# Patient Record
Sex: MALE | Race: WHITE | NOT HISPANIC OR LATINO | Employment: OTHER | URBAN - METROPOLITAN AREA
[De-identification: names, ages, dates, MRNs, and addresses within clinical notes are randomized per-mention and may not be internally consistent; named-entity substitution may affect disease eponyms.]

---

## 2017-01-16 ENCOUNTER — GENERIC CONVERSION - ENCOUNTER (OUTPATIENT)
Dept: OTHER | Facility: OTHER | Age: 62
End: 2017-01-16

## 2017-02-06 ENCOUNTER — GENERIC CONVERSION - ENCOUNTER (OUTPATIENT)
Dept: OTHER | Facility: OTHER | Age: 62
End: 2017-02-06

## 2017-03-02 ENCOUNTER — GENERIC CONVERSION - ENCOUNTER (OUTPATIENT)
Dept: OTHER | Facility: OTHER | Age: 62
End: 2017-03-02

## 2017-03-06 ENCOUNTER — GENERIC CONVERSION - ENCOUNTER (OUTPATIENT)
Dept: OTHER | Facility: OTHER | Age: 62
End: 2017-03-06

## 2017-04-03 ENCOUNTER — GENERIC CONVERSION - ENCOUNTER (OUTPATIENT)
Dept: OTHER | Facility: OTHER | Age: 62
End: 2017-04-03

## 2017-05-04 ENCOUNTER — GENERIC CONVERSION - ENCOUNTER (OUTPATIENT)
Dept: OTHER | Facility: OTHER | Age: 62
End: 2017-05-04

## 2017-06-28 ENCOUNTER — GENERIC CONVERSION - ENCOUNTER (OUTPATIENT)
Dept: OTHER | Facility: OTHER | Age: 62
End: 2017-06-28

## 2017-08-22 ENCOUNTER — ALLSCRIPTS OFFICE VISIT (OUTPATIENT)
Dept: OTHER | Facility: OTHER | Age: 62
End: 2017-08-22

## 2017-08-22 LAB
BILIRUB UR QL STRIP: ABNORMAL
CLARITY UR: ABNORMAL
COLOR UR: YELLOW
GLUCOSE (HISTORICAL): ABNORMAL
HGB UR QL STRIP.AUTO: ABNORMAL
KETONES UR STRIP-MCNC: ABNORMAL MG/DL
LEUKOCYTE ESTERASE UR QL STRIP: ABNORMAL
NITRITE UR QL STRIP: ABNORMAL
PH UR STRIP.AUTO: 5 [PH]
PROT UR STRIP-MCNC: ABNORMAL MG/DL
SP GR UR STRIP.AUTO: <=1.005
UROBILINOGEN UR QL STRIP.AUTO: 0.2

## 2017-08-24 ENCOUNTER — ALLSCRIPTS OFFICE VISIT (OUTPATIENT)
Dept: OTHER | Facility: OTHER | Age: 62
End: 2017-08-24

## 2017-08-24 DIAGNOSIS — Z00.00 ENCOUNTER FOR GENERAL ADULT MEDICAL EXAMINATION WITHOUT ABNORMAL FINDINGS: ICD-10-CM

## 2017-08-24 DIAGNOSIS — E78.5 HYPERLIPIDEMIA: ICD-10-CM

## 2017-11-29 ENCOUNTER — GENERIC CONVERSION - ENCOUNTER (OUTPATIENT)
Dept: OTHER | Facility: OTHER | Age: 62
End: 2017-11-29

## 2017-11-30 ENCOUNTER — GENERIC CONVERSION - ENCOUNTER (OUTPATIENT)
Dept: OTHER | Facility: OTHER | Age: 62
End: 2017-11-30

## 2018-01-12 VITALS
WEIGHT: 193 LBS | HEIGHT: 68 IN | SYSTOLIC BLOOD PRESSURE: 102 MMHG | DIASTOLIC BLOOD PRESSURE: 72 MMHG | BODY MASS INDEX: 29.25 KG/M2

## 2018-01-15 NOTE — PROGRESS NOTES
Assessment    1  Encounter for preventive health examination (V70 0) (Z00 00)    Plan  Health Maintenance    · (1) COMPREHENSIVE METABOLIC PANEL; Status:Active; Requested for:24Aug2017;    · COLONOSCOPY; Status:Active; Requested for:24Aug2017;    · 3 - Tiago AGUILAR, Devon Mortimer  (Gastroenterology) Co-Management  *  Status: Hold For -  Scheduling  Requested for: 45RGI3100  are Referring to a non- Preferred Provider : Established Patient  Care Summary provided  : Yes   · Fluarix Quadrivalent 0 5 ML Intramuscular Suspension Prefilled Syringe;  INJECT 0 5  ML Intramuscular; To Be Done: 24Aug2017  Health Maintenance, Hyperlipidemia    · (1) CBC/ PLT (NO DIFF); Status:Active; Requested for:24Aug2017;    · (1) LIPID PANEL, FASTING; Status:Active; Requested for:24Aug2017;     Discussion/Summary  health maintenance visit Currently, he eats a healthy diet  Prostate cancer screening: the risks and benefits of prostate cancer screening were discussed  Testicular cancer screening: the risks and benefits of testicular cancer screening were discussed  Colorectal cancer screening: the risks and benefits of colorectal cancer screening were discussed  I will call him with the lab test results  We'll set him up for colonoscopy he  Continue current therapy follow-up in one year sooner if needed  Chief Complaint  PATIENT PRESENTS TODAY FOR A YEARLY PREVENTATIVE, STATES THAT IN MAY HE HAD A CARDIAC Ablastin AND IS FEELING BETTER  PATIENT WOULD ALSO LIKE TO HAVE A FLU SHOT TODAY  History of Present Illness  HM, Adult Male: The patient is being seen for a health maintenance evaluation  General Health: The patient's health since the last visit is described as good  Lifestyle:  He consumes a diverse and healthy diet  Screening:   HPI: He is feeling well  He had cardio ablation about 3 months ago or so and feels much better after        Review of Systems    Constitutional: No fever or chills, feels well, no tiredness, no recent weight gain or weight loss  Eyes: No complaints of eye pain, no red eyes, no discharge from eyes, no itchy eyes  ENT: no complaints of earache, no hearing loss, no nosebleeds, no nasal discharge, no sore throat, no hoarseness  Cardiovascular: No complaints of slow heart rate, no fast heart rate, no chest pain, no palpitations, no leg claudication, no lower extremity  Respiratory: No complaints of shortness of breath, no wheezing, no cough, no SOB on exertion, no orthopnea or PND  Gastrointestinal: No complaints of abdominal pain, no constipation, no nausea or vomiting, no diarrhea or bloody stools  Genitourinary: No complaints of dysuria, no incontinence, no hesitancy, no nocturia, no genital lesion, no testicular pain  Musculoskeletal: history of muscular dystrophy, but No complaints of arthralgia, no myalgias, no joint swelling or stiffness, no limb pain or swelling  Integumentary: No complaints of skin rash or skin lesions, no itching, no skin wound, no dry skin  Neurological: history of muscular dystrophy, but No compliants of headache, no confusion, no convulsions, no numbness or tingling, no dizziness or fainting, no limb weakness, no difficulty walking  Psychiatric: Is not suicidal, no sleep disturbances, no anxiety or depression, no change in personality, no emotional problems  Endocrine: No complaints of proptosis, no hot flashes, no muscle weakness, no erectile dysfunction, no deepening of the voice, no feelings of weakness  Hematologic/Lymphatic: No complaints of swollen glands, no swollen glands in the neck, does not bleed easily, no easy bruising  Active Problems    1  Benign prostatic hyperplasia with lower urinary tract symptoms, symptom details   unspecified (600 01) (N40 1)   2  Cardiomyopathy (425 4) (I42 9)   3  Encounter for prostate cancer screening (V76 44) (Z12 5)   4  Enlarged prostate without lower urinary tract symptoms (luts) (600 00) (N40 0)   5   Erectile dysfunction (607 84) (N52 9)   6  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)   7  Hyperlipidemia (272 4) (E78 5)   8  Hypertension (401 9) (I10)   9  Nocturia (788 43) (R35 1)   10  Paroxysmal ventricular tachycardia (427 1) (I47 2)   11  Retention of urine (788 20) (R33 9)   12  Sleep apnea (780 57) (G47 30)   13  Subconjunctival hemorrhage of right eye (372 72) (H11 31)   14  Urinary frequency (788 41) (R35 0)    Past Medical History    · Cardiomyopathy (425 4) (I42 9)   · History of atrial fibrillation (V12 59) (Z86 79)   · Hypertension (401 9) (I10)    Surgical History    · History of Appendectomy   · History of Cardio-Defib Pulse Gen Venous Insertion Of Electrode For Ventricular Pacing   · History of Hernia Repair   · History of Tonsillectomy    Family History  Mother    · No pertinent family history  Father    · Family history of malignant neoplasm of prostate (V16 42) (Z80 45)  Family History    · Family history of Heart Disease (V17 49)    Social History    · Being A Social Drinker   ·    · Never A Smoker    Current Meds   1  Finasteride 5 MG Oral Tablet; Take 1 tablet daily; Therapy: 51Fpo3979 to (Evaluate:02Bwk3502)  Requested for: 88Xbp0741; Last   Rx:97Iva1873 Ordered   2  Finasteride 5 MG Oral Tablet; TAKE 1 TABLET DAILY; Therapy: 52RLS6703 to (Evaluate:59Htd8558)  Requested for: 35JAP8311; Last   Rx:85Tji6170; Status: ACTIVE - Renewal Denied Ordered   3  Fish Oil CAPS; Therapy: (Recorded:69Pro5672) to Recorded   4  Lasix 40 MG Oral Tablet; Therapy: (Recorded:10Oct2016) to Recorded   5  Lisinopril 2 5 MG Oral Tablet; TAKE 1 TABLET TWICE DAILY; Therapy: (Recorded:19Vky1544) to Recorded   6  Meclizine HCl - 25 MG Oral Tablet; TAKE 1 TABLET  PRN; Therapy: (Recorded:48Sea8991) to Recorded   7  Metoprolol Succinate ER 25 MG Oral Tablet Extended Release 24 Hour; Take 1 tablet   twice daily; Therapy: (Recorded:05Gzq2333) to Recorded   8  Multiple Vitamin TABS;    Therapy: (Recorded:10Msc4955) to Recorded   9  Pantoprazole Sodium 40 MG Oral Tablet Delayed Release; Take 1 tablet twice daily; Therapy: (Recorded:78Smt7346) to Recorded   10  Sotalol HCl - 80 MG Oral Tablet; TAKE 1 TABLET TWICE DAILY; Therapy: 39JWC3262 to (Evaluate:97Dvh0971) Recorded   11  Spironolactone 25 MG Oral Tablet; Therapy: (Recorded:11Sep2014) to Recorded   12  Vitamin B-12 TABS; Therapy: (Recorded:11Sep2014) to Recorded   13  Vitamin D CAPS; Therapy: (Recorded:11Sep2014) to Recorded   14  Xarelto 20 MG Oral Tablet; Therapy: (Recorded:11Sep2014) to Recorded    Allergies    1  Statins    Vitals   Recorded: 54NBP9826 82:47MC   Systolic 609, RUE, Sitting   Diastolic 70, RUE, Sitting   Height 5 ft 8 in   Weight 199 lb 2 oz   BMI Calculated 30 28   BSA Calculated 2 04     Physical Exam    Constitutional   General appearance: No acute distress, well appearing and well nourished  Eyes   Conjunctiva and lids: No swelling, erythema, or discharge  Pupils and irises: Equal, round and reactive to light  Ears, Nose, Mouth, and Throat   External inspection of ears and nose: Normal     Otoscopic examination: Tympanic membrance translucent with normal light reflex  Canals patent without erythema  Oropharynx: Normal with no erythema, edema, exudate or lesions  Pulmonary   Respiratory effort: No increased work of breathing or signs of respiratory distress  Auscultation of lungs: Clear to auscultation  Cardiovascular   Palpation of heart: Normal PMI, no thrills  Auscultation of heart: Normal rate and rhythm, normal S1 and S2, without murmurs  Examination of extremities for edema and/or varicosities: Normal     Abdomen   Abdomen: Non-tender, no masses  Liver and spleen: No hepatomegaly or splenomegaly  Lymphatic   Palpation of lymph nodes in neck: No lymphadenopathy  Musculoskeletal   Gait and station: Normal     Digits and nails: Normal without clubbing or cyanosis  Inspection/palpation of joints, bones, and muscles: Normal     Skin   Skin and subcutaneous tissue: Normal without rashes or lesions  Neurologic   Cranial nerves: Cranial nerves 2-12 intact  Reflexes: 2+ and symmetric  Sensation: No sensory loss  Psychiatric   Orientation to person, place and time: Normal     Mood and affect: Normal        Results/Data  PHQ-9 Adult Depression Screening 05Ewl1172 09:14AM User, Ahs     Test Name Result Flag Reference   PHQ-9 Adult Depression Score 0     Over the last two weeks, how often have you been bothered by any of the following problems? Little interest or pleasure in doing things: Not at all - 0  Feeling down, depressed, or hopeless: Not at all - 0  Trouble falling or staying asleep, or sleeping too much: Not at all - 0  Feeling tired or having little energy: Not at all - 0  Poor appetite or over eating: Not at all - 0  Feeling bad about yourself - or that you are a failure or have let yourself or your family down: Not at all - 0  Trouble concentrating on things, such as reading the newspaper or watching television: Not at all - 0  Moving or speaking so slowly that other people could have noticed   Or the opposite -  being so fidgety or restless that you have been moving around a lot more than usual: Not at all - 0  Thoughts that you would be better off dead, or of hurting yourself in some way: Not at all - 0   PHQ-9 Adult Depression Screening Negative     PHQ-9 Difficulty Level Not difficult at all     PHQ-9 Severity No Depression         Future Appointments    Date/Time Provider Specialty Site   09/04/2018 09:00 AM Ruddy Rosa MD Urology [address] [place] [address]     Signatures   Electronically signed by : Mauri Velasquez DO; Aug 24 2017  9:40AM EST                       (Author)

## 2018-01-17 NOTE — MISCELLANEOUS
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)    Discussion/Summary  Discussion Summary:   61 y/o male with: Muscular dystrophy causing cardiomyopathy and V  tach  Discussed supportive care and return parameters  Encouraged follow-up with Cardiology and MD specialists  Encouraged patient to consider counselling as well  Chief Complaint  Chief Complaint Free Text Note Form: Patient presents himself today for [facility] after being released from hospital, Pt states that he is not feeling better  pt states that he is not having any pain  History of Present Illness  HPI: Patient is a 61 y/o male who presents for hospital follow-up  Patient had ICD shocked 8/6/16 over night for V  tach  Patient saw Arianna Iron and had Sotalol increased and had other medication adjustment and was discharged 8/15/16  Patient has h/o muscular dystrophy and h/o related cardiomyopathy and over the past year had better symptoms as he was not shocked since last year  Review of Systems  Complete-Male:   Constitutional: No fever or chills, feels well, no tiredness, no recent weight gain or weight loss  Eyes: No complaints of eye pain, no red eyes, no discharge from eyes, no itchy eyes  ENT: no complaints of earache, no hearing loss, no nosebleeds, no nasal discharge, no sore throat, no hoarseness  Cardiovascular: No complaints of slow heart rate, no fast heart rate, no chest pain, no palpitations, no leg claudication, no lower extremity  Respiratory: No complaints of shortness of breath, no wheezing, no cough, no SOB on exertion, no orthopnea or PND  Gastrointestinal: No complaints of abdominal pain, no constipation, no nausea or vomiting, no diarrhea or bloody stools  Genitourinary: No complaints of dysuria, no incontinence, no hesitancy, no nocturia, no genital lesion, no testicular pain     Musculoskeletal: No complaints of arthralgia, no myalgias, no joint swelling or stiffness, no limb pain or swelling  Integumentary: No complaints of skin rash or skin lesions, no itching, no skin wound, no dry skin  Neurological: No compliants of headache, no confusion, no convulsions, no numbness or tingling, no dizziness or fainting, no limb weakness, no difficulty walking  Psychiatric: Is not suicidal, no sleep disturbances, no anxiety or depression, no change in personality, no emotional problems  Endocrine: No complaints of proptosis, no hot flashes, no muscle weakness, no erectile dysfunction, no deepening of the voice, no feelings of weakness  Hematologic/Lymphatic: No complaints of swollen glands, no swollen glands in the neck, does not bleed easily, no easy bruising  Active Problems    1  Cardiomyopathy (425 4) (I42 9)   2  Enlarged prostate without lower urinary tract symptoms (luts) (600 00) (N40 0)   3  Erectile dysfunction (607 84) (N52 9)   4  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)   5  Hyperlipidemia (272 4) (E78 5)   6  Hypertension (401 9) (I10)   7  Nocturia (788 43) (R35 1)   8  Sleep apnea (780 57) (G47 30)   9  Subconjunctival hemorrhage of right eye (372 72) (H11 31)   10  Urinary frequency (788 41) (R35 0)    Past Medical History    1  History of atrial fibrillation (V12 59) (Z86 79)    Surgical History    1  History of Appendectomy   2  History of Cardio-Defib Pulse Gen Venous Insertion Of Electrode For Ventricular Pacing   3  History of Hernia Repair   4  History of Tonsillectomy    Family History  Mother    1  No pertinent family history  Family History    2  Family history of Heart Disease (V17 49)    Social History    · Being A Social Drinker   ·    · Never A Smoker    Current Meds   1  Enalapril Maleate 2 5 MG Oral Tablet; TAKE 1 TABLET TWICE DAILY; Therapy: 19PNL9327 to Recorded   2  Finasteride 5 MG Oral Tablet; TAKE 1 TABLET DAILY;    Therapy: 16FJR8097 to (Evaluate:84Rzq5719)  Requested for: 77RQH3868; Last   Rx:02Shw7881 Ordered   3  Fish Oil CAPS; Therapy: (Recorded:00Auv2929) to Recorded   4  Furosemide 20 MG Oral Tablet Recorded   5  Mexiletine HCl - 150 MG Oral Capsule; TAKE 1 CAPSULE 3 TIMES DAILY; Therapy: 00Bli9027 to Recorded   6  Multiple Vitamin TABS; Therapy: (Recorded:50Tzn0920) to Recorded   7  Sotalol HCl - 80 MG Oral Tablet; TAKE 1 TABLET TWICE DAILY; Therapy: 76KWT0536 to (Evaluate:73Efo1501) Recorded   8  Spironolactone 25 MG Oral Tablet; Therapy: (Recorded:76Mhn7172) to Recorded   9  Vitamin B-12 TABS; Therapy: (Recorded:87Edt6212) to Recorded   10  Vitamin D CAPS; Therapy: (Recorded:35Uqp8814) to Recorded   11  Xarelto 20 MG Oral Tablet; Therapy: (Recorded:08Mmi0016) to Recorded  Medication List Reviewed: The medication list was reviewed and updated today  Allergies    1  Statins    Vitals  Signs   Recorded: 94ZIW0538 18:93TS   Systolic: 546, RUE, Sitting  Diastolic: 70, RUE, Sitting  Heart Rate: 64, R Radial  Temperature: 97 9 F, Tympanic  Height: 5 ft 8 in  Weight: 192 lb 6 oz  BMI Calculated: 29 25  BSA Calculated: 2 01    Physical Exam    Constitutional   General appearance: No acute distress, well appearing and well nourished  Eyes   Conjunctiva and lids: No swelling, erythema, or discharge  Pupils and irises: Equal, round and reactive to light  Ears, Nose, Mouth, and Throat   External inspection of ears and nose: Normal     Pulmonary   Respiratory effort: No increased work of breathing or signs of respiratory distress  Auscultation of lungs: Clear to auscultation, equal breath sounds bilaterally, no wheezes, no rales, no rhonci  Cardiovascular   Auscultation of heart: Normal rate and rhythm, normal S1 and S2, without murmurs  Examination of extremities for edema and/or varicosities: Normal     Abdomen   Abdomen: Non-tender, no masses  Liver and spleen: No hepatomegaly or splenomegaly  Lymphatic   Palpation of lymph nodes in neck: No lymphadenopathy  Musculoskeletal   Gait and station: Normal     Digits and nails: Normal without clubbing or cyanosis  Skin   Skin and subcutaneous tissue: Normal without rashes or lesions  Neurologic   Cranial nerves: Cranial nerves 2-12 intact  Psychiatric   Orientation to person, place and time: Normal     Mood and affect: Normal          Future Appointments    Date/Time Provider Specialty Site   11/30/2016 08:30 AM FALGUNI Coates   Urology [facility] [place]   08/24/2017 09:00 AM Pavan Gallego [address]     Signatures   Electronically signed by : FALGUNI Hong ; Aug 23 2016  4:55PM EST                       (Author)

## 2018-01-22 VITALS
HEIGHT: 68 IN | WEIGHT: 199.13 LBS | BODY MASS INDEX: 30.18 KG/M2 | SYSTOLIC BLOOD PRESSURE: 102 MMHG | DIASTOLIC BLOOD PRESSURE: 70 MMHG

## 2018-08-03 DIAGNOSIS — N13.9 BENIGN LOCALIZED HYPERPLASIA OF PROSTATE WITH URINARY OBSTRUCTION AND LOWER URINARY TRACT SYMPTOMS: Primary | ICD-10-CM

## 2018-08-03 DIAGNOSIS — N40.1 BENIGN LOCALIZED HYPERPLASIA OF PROSTATE WITH URINARY OBSTRUCTION AND LOWER URINARY TRACT SYMPTOMS: Primary | ICD-10-CM

## 2018-08-03 RX ORDER — FINASTERIDE 5 MG/1
TABLET, FILM COATED ORAL
Qty: 90 TABLET | Refills: 3 | OUTPATIENT
Start: 2018-08-03

## 2018-08-03 NOTE — TELEPHONE ENCOUNTER
Patient was last seen in September, 2017 and has an upcoming appointment schedule for 9/5/18 @ 9:30am   Script for Finasteride 5mg 1 PO QD #90 with NO refills was queued and forwarded to Dr Danis Dias for approval

## 2018-08-04 RX ORDER — FINASTERIDE 5 MG/1
5 TABLET, FILM COATED ORAL DAILY
Qty: 90 TABLET | Refills: 0 | Status: SHIPPED | OUTPATIENT
Start: 2018-08-04

## 2018-08-22 DIAGNOSIS — Z12.5 ENCOUNTER FOR SCREENING FOR MALIGNANT NEOPLASM OF PROSTATE: ICD-10-CM

## 2018-08-29 RX ORDER — METOPROLOL SUCCINATE 25 MG/1
12.5 TABLET, EXTENDED RELEASE ORAL 2 TIMES DAILY
Refills: 3 | COMMUNITY
Start: 2018-06-25

## 2018-08-29 RX ORDER — MECLIZINE HYDROCHLORIDE 25 MG/1
1 TABLET ORAL
COMMUNITY

## 2018-08-29 RX ORDER — PANTOPRAZOLE SODIUM 40 MG/1
1 TABLET, DELAYED RELEASE ORAL 2 TIMES DAILY
COMMUNITY

## 2018-08-29 RX ORDER — LISINOPRIL 2.5 MG/1
2.5 TABLET ORAL 2 TIMES DAILY
Refills: 3 | COMMUNITY
Start: 2018-06-25

## 2018-08-29 RX ORDER — FUROSEMIDE 20 MG/1
20 TABLET ORAL DAILY
Refills: 1 | COMMUNITY
Start: 2018-07-20

## 2018-08-29 RX ORDER — MULTIVIT-MIN/IRON/FOLIC ACID/K 18-600-40
CAPSULE ORAL
COMMUNITY

## 2018-08-29 RX ORDER — SPIRONOLACTONE 25 MG/1
25 TABLET ORAL DAILY
Refills: 1 | COMMUNITY
Start: 2018-06-25

## 2018-08-29 RX ORDER — UBIDECARENONE 75 MG
CAPSULE ORAL
COMMUNITY

## 2018-08-29 RX ORDER — SOTALOL HYDROCHLORIDE 120 MG/1
120 TABLET ORAL EVERY 12 HOURS
Refills: 2 | COMMUNITY
Start: 2018-07-23

## 2018-08-31 ENCOUNTER — TELEPHONE (OUTPATIENT)
Dept: UROLOGY | Facility: AMBULATORY SURGERY CENTER | Age: 63
End: 2018-08-31

## 2018-08-31 NOTE — TELEPHONE ENCOUNTER
Patient did not get his PSA test done yet, he wants to know if he still needs to come to the appointment?  Please advise and call back

## 2018-10-02 ENCOUNTER — TELEPHONE (OUTPATIENT)
Dept: FAMILY MEDICINE CLINIC | Facility: CLINIC | Age: 63
End: 2018-10-02

## 2018-10-02 ENCOUNTER — TELEPHONE (OUTPATIENT)
Dept: UROLOGY | Facility: AMBULATORY SURGERY CENTER | Age: 63
End: 2018-10-02

## 2018-10-02 DIAGNOSIS — N13.8 ENLARGED PROSTATE WITH URINARY OBSTRUCTION: Primary | ICD-10-CM

## 2018-10-02 DIAGNOSIS — N40.1 ENLARGED PROSTATE WITH URINARY OBSTRUCTION: Primary | ICD-10-CM

## 2018-10-02 NOTE — TELEPHONE ENCOUNTER
Patient at  The lab now, he needs new scripts faxed to lab  Please fax over in [address]  Thank you

## 2018-11-03 DIAGNOSIS — N40.1 BENIGN LOCALIZED HYPERPLASIA OF PROSTATE WITH URINARY OBSTRUCTION AND LOWER URINARY TRACT SYMPTOMS: ICD-10-CM

## 2018-11-03 DIAGNOSIS — N13.9 BENIGN LOCALIZED HYPERPLASIA OF PROSTATE WITH URINARY OBSTRUCTION AND LOWER URINARY TRACT SYMPTOMS: ICD-10-CM

## 2018-11-05 RX ORDER — FINASTERIDE 5 MG/1
TABLET, FILM COATED ORAL
Qty: 90 TABLET | Refills: 0 | OUTPATIENT
Start: 2018-11-05

## 2018-11-06 NOTE — TELEPHONE ENCOUNTER
Patient cancelled his September, 2018 office visit via the [email or web address] system  If the patient is in need of additional medication they can call us directly  No further action required

## 2018-11-07 DIAGNOSIS — N40.1 BENIGN LOCALIZED HYPERPLASIA OF PROSTATE WITH URINARY OBSTRUCTION AND LOWER URINARY TRACT SYMPTOMS: ICD-10-CM

## 2018-11-07 DIAGNOSIS — N13.9 BENIGN LOCALIZED HYPERPLASIA OF PROSTATE WITH URINARY OBSTRUCTION AND LOWER URINARY TRACT SYMPTOMS: ICD-10-CM

## 2018-11-07 RX ORDER — FINASTERIDE 5 MG/1
TABLET, FILM COATED ORAL
Qty: 90 TABLET | Refills: 0 | OUTPATIENT
Start: 2018-11-07

## 2018-11-07 NOTE — TELEPHONE ENCOUNTER
Patient was due back in September for his next routine office visit  Medication rejected correctly for "needs appointment "  I await additional communication from the patient before refilling additional medication

## 2018-11-27 DIAGNOSIS — N13.9 BENIGN LOCALIZED HYPERPLASIA OF PROSTATE WITH URINARY OBSTRUCTION AND LOWER URINARY TRACT SYMPTOMS: ICD-10-CM

## 2018-11-27 DIAGNOSIS — N40.1 BENIGN LOCALIZED HYPERPLASIA OF PROSTATE WITH URINARY OBSTRUCTION AND LOWER URINARY TRACT SYMPTOMS: ICD-10-CM

## 2018-11-27 RX ORDER — FINASTERIDE 5 MG/1
TABLET, FILM COATED ORAL
Qty: 90 TABLET | Refills: 0 | OUTPATIENT
Start: 2018-11-27

## 2018-11-27 NOTE — TELEPHONE ENCOUNTER
Patient cancelled his September, 2018 office visit via the [employer] system  If the patient is in need of additional medication they can call us directly  I did leave a message on the home phone voice mail regarding need for appointment BEFORE additional refills would be issued  No further action required

## 2020-03-11 ENCOUNTER — TELEPHONE (OUTPATIENT)
Dept: FAMILY MEDICINE CLINIC | Facility: CLINIC | Age: 65
End: 2020-03-11

## 2020-03-11 ENCOUNTER — OFFICE VISIT (OUTPATIENT)
Dept: FAMILY MEDICINE CLINIC | Facility: CLINIC | Age: 65
End: 2020-03-11
Payer: MEDICARE

## 2020-03-11 VITALS
DIASTOLIC BLOOD PRESSURE: 80 MMHG | SYSTOLIC BLOOD PRESSURE: 114 MMHG | BODY MASS INDEX: 30.92 KG/M2 | OXYGEN SATURATION: 100 % | WEIGHT: 204 LBS | HEIGHT: 68 IN | HEART RATE: 72 BPM

## 2020-03-11 DIAGNOSIS — I42.8 NONISCHEMIC CARDIOMYOPATHY (HCC): Primary | ICD-10-CM

## 2020-03-11 DIAGNOSIS — H90.0 CONDUCTIVE HEARING LOSS, BILATERAL: ICD-10-CM

## 2020-03-11 DIAGNOSIS — Z00.00 ENCOUNTER FOR SCREENING AND PREVENTATIVE CARE: ICD-10-CM

## 2020-03-11 DIAGNOSIS — Z12.11 SCREENING FOR COLORECTAL CANCER: ICD-10-CM

## 2020-03-11 DIAGNOSIS — I48.91 ATRIAL FIBRILLATION, UNSPECIFIED TYPE (HCC): ICD-10-CM

## 2020-03-11 DIAGNOSIS — Z11.4 SCREENING FOR HIV (HUMAN IMMUNODEFICIENCY VIRUS): ICD-10-CM

## 2020-03-11 DIAGNOSIS — Z12.12 SCREENING FOR COLORECTAL CANCER: ICD-10-CM

## 2020-03-11 DIAGNOSIS — Z11.59 NEED FOR HEPATITIS C SCREENING TEST: ICD-10-CM

## 2020-03-11 PROBLEM — N40.1 BENIGN PROSTATIC HYPERPLASIA WITH LOWER URINARY TRACT SYMPTOMS: Status: ACTIVE | Noted: 2017-08-18

## 2020-03-11 PROCEDURE — 99214 OFFICE O/P EST MOD 30 MIN: CPT | Performed by: FAMILY MEDICINE

## 2020-03-11 PROCEDURE — 3008F BODY MASS INDEX DOCD: CPT | Performed by: FAMILY MEDICINE

## 2020-03-11 PROCEDURE — 1036F TOBACCO NON-USER: CPT | Performed by: FAMILY MEDICINE

## 2020-03-11 PROCEDURE — G0438 PPPS, INITIAL VISIT: HCPCS | Performed by: FAMILY MEDICINE

## 2020-03-11 NOTE — PROGRESS NOTES
Assessment/Plan:    He is doing well  He will follow up with Cardiology  He also follows up with good Kwong for the muscular dystrophy  I will call with the lab test results  Will set him up for a colonoscopy  Diagnoses and all orders for this visit:    Encounter for screening and preventative care    Need for hepatitis C screening test  -     Hepatitis C antibody; Future    Screening for colorectal cancer  -     Ambulatory referral to Gastroenterology; Future    Screening for HIV (human immunodeficiency virus)  -     HIV 1/2 Antigen/Antibody (4th Generation) w Reflex [facility]; Future    Nonischemic cardiomyopathy (HCC)  -     Lipid panel  -     Comprehensive metabolic panel  -     CBC and differential  -     TSH, 3rd generation with Free T4 reflex    Atrial fibrillation, unspecified type (HCC)  -     Lipid panel  -     Comprehensive metabolic panel  -     CBC and differential  -     TSH, 3rd generation with Free T4 reflex            Subjective:        Patient ID: Telma Francis is a 62 y o  male  Patient presents with:  Medicare Wellness Visit:  last seen 2017    His his wife feels he has a hearing problem  The following portions of the patient's history were reviewed and updated as appropriate: allergies, current medications, past family history, past medical history, past social history, past surgical history and problem list       Review of Systems   Constitutional: Negative  HENT: Positive for hearing loss  Eyes: Negative  Respiratory: Negative  Cardiovascular: Negative  Gastrointestinal: Negative  Endocrine: Negative  Genitourinary: Negative  Musculoskeletal: Positive for gait problem and myalgias  Skin: Negative  Allergic/Immunologic: Negative  Hematological: Negative  Psychiatric/Behavioral: Negative  All other systems reviewed and are negative  Objective:      BMI Counseling: Body mass index is 31 02 kg/m²   The BMI is above normal  Nutrition recommendations include decreasing portion sizes, encouraging healthy choices of fruits and vegetables, decreasing fast food intake, consuming healthier snacks, limiting drinks that contain sugar, moderation in carbohydrate intake, increasing intake of lean protein, reducing intake of saturated and trans fat and reducing intake of cholesterol  Exercise recommendations include moderate physical activity 150 minutes/week  No pharmacotherapy was ordered  /80   Pulse 72   Ht 5' 8" (1 727 m)   Wt 92 5 kg (204 lb)   SpO2 100%   BMI 31 02 kg/m²          Physical Exam   Constitutional: He is oriented to person, place, and time  He appears well-developed and well-nourished  HENT:   Head: Normocephalic and atraumatic  Right Ear: External ear normal    Left Ear: External ear normal    Nose: Nose normal    Mouth/Throat: Oropharynx is clear and moist    Eyes: Pupils are equal, round, and reactive to light  Conjunctivae and EOM are normal    Neck: Normal range of motion  Neck supple  Cardiovascular: Normal rate, regular rhythm and normal heart sounds  Pulmonary/Chest: Effort normal and breath sounds normal    Abdominal: Soft  Bowel sounds are normal    Neurological: He is alert and oriented to person, place, and time  He has normal reflexes  Skin: Skin is warm and dry  Psychiatric: He has a normal mood and affect  His behavior is normal    Nursing note and vitals reviewed

## 2020-03-11 NOTE — PROGRESS NOTES
Assessment and Plan:     Problem List Items Addressed This Visit     None        BMI Counseling: Body mass index is 31 02 kg/m²  The BMI is above normal  Nutrition recommendations include decreasing portion sizes, encouraging healthy choices of fruits and vegetables, decreasing fast food intake, consuming healthier snacks, limiting drinks that contain sugar, moderation in carbohydrate intake, increasing intake of lean protein, reducing intake of saturated and trans fat and reducing intake of cholesterol  Exercise recommendations include moderate physical activity 150 minutes/week  No pharmacotherapy was ordered  Preventive health issues were discussed with patient, and age appropriate screening tests were ordered as noted in patient's After Visit Summary  Personalized health advice and appropriate referrals for health education or preventive services given if needed, as noted in patient's After Visit Summary  History of Present Illness:     Patient presents for Medicare Annual Wellness visit    Patient Care Team:  Víctor Layton DO as PCP - General (Family Medicine)  Maynor Mills MD     Problem List:     There is no problem list on file for this patient       Past Medical and Surgical History:     Past Medical History:   Diagnosis Date    [facility] [address] [facility]     BPH with obstruction/lower urinary tract symptoms     Cardiomyopathy [facility] [address] [facility]     Last assessed 10/10/2016     Hypertension     Other retention of urine     Paroxysmal ventricular tachycardia (HCC)     Last assessed 8/23/2016      Past Surgical History:   Procedure Laterality Date    APPENDECTOMY      CARDIAC DEFIBRILLATOR PLACEMENT      Cardio-Dedfib pulse gen venous insertion of electrode for ventricular pacing    HERNIA REPAIR      TONSILLECTOMY        Family History:     Family History   Problem Relation Age of Onset    Prostate cancer Father     No Known Problems Mother     Heart disease Family       Social History: Social History     Socioeconomic History    Marital status: /Civil Union     Spouse name: Not on file    Number of children: Not on file    Years of education: Not on file    Highest education level: Not on file   Occupational History    Not on file   Social Needs    Financial resource strain: Not on file    Food insecurity:     Worry: Not on file     Inability: Not on file    Transportation needs:     Medical: Not on file     Non-medical: Not on file   Tobacco Use    Smoking status: Never Smoker   Substance and Sexual Activity    Alcohol use: Yes     Comment: Social     Drug use: Not on file    Sexual activity: Not on file   Lifestyle    Physical activity:     Days per week: Not on file     Minutes per session: Not on file    Stress: Not on file   Relationships    Social connections:     Talks on phone: Not on file     Gets together: Not on file     Attends Protestant service: Not on file     Active member of club or organization: Not on file     Attends meetings of clubs or organizations: Not on file     Relationship status: Not on file    Intimate partner violence:     Fear of current or ex partner: Not on file     Emotionally abused: Not on file     Physically abused: Not on file     Forced sexual activity: Not on file   Other Topics Concern    Not on file   Social History Narrative    Not on file      Medications and Allergies:     Current Outpatient Medications   Medication Sig Dispense Refill    Cholecalciferol (VITAMIN D) 2000 units CAPS Take by mouth      cyanocobalamin (VITAMIN B-12) 100 mcg tablet Take by mouth      furosemide (LASIX) 20 mg tablet Take 20 mg by mouth daily  1    lisinopril (ZESTRIL) 2 5 mg tablet 2 5 mg 2 (two) times a day  3    meclizine (ANTIVERT) 25 mg tablet Take 1 tablet by mouth      metoprolol succinate (TOPROL-XL) 25 mg 24 hr tablet Take 12 5 mg by mouth 2 (two) times a day  3    Omega-3 Fatty Acids (FISH OIL) 645 MG CAPS Take by mouth      pantoprazole (PROTONIX) 40 mg tablet Take 1 tablet by mouth 2 (two) times a day      rivaroxaban (XARELTO) 20 mg tablet Take by mouth      sotalol (BETAPACE) 120 mg tablet Take 120 mg by mouth every 12 (twelve) hours  2    spironolactone (ALDACTONE) 25 mg tablet Take 25 mg by mouth daily  1    finasteride (PROSCAR) 5 mg tablet Take 1 tablet (5 mg total) by mouth daily (Patient not taking: Reported on 3/11/2020) 90 tablet 0     No current facility-administered medications for this visit  Allergies   Allergen Reactions    Statins       Immunizations:     Immunization History   Administered Date(s) Administered     Influenza (IM) Preservative Free 12/14/2012, 11/11/2013, 09/11/2019    Influenza Quadrivalent Preservative Free 3 years and older IM 10/10/2016, 08/24/2017    Influenza TIV (IM) 09/01/2010, 10/01/2010, 11/04/2015, 10/08/2017    Pneumococcal Polysaccharide PPV23 01/01/2007, 08/17/2009    Tdap 08/17/2009      Health Maintenance:         Topic Date Due    Hepatitis C Screening  1955    CRC Screening: Colonoscopy  1955         Topic Date Due    DTaP,Tdap,and Td Vaccines (2 - Td) 08/17/2019      Medicare Health Risk Assessment:     /80   Pulse 72   Ht 5' 8" (1 727 m)   Wt 92 5 kg (204 lb)   SpO2 100%   BMI 31 02 kg/m²      Kodak Riding is here for his Welcome to Medicare visit  Health Risk Assessment:   Patient rates overall health as poor  Patient feels that their physical health rating is slightly worse  Hearing was rated as same  Patient feels that their emotional and mental health rating is same  Pain experienced in the last 7 days has been a lot  Patient's pain rating has been 6/10  Patient states that he has experienced no weight loss or gain in last 6 months  Depression Screening:   PHQ-2 Score: 0      Fall Risk Screening:    In the past year, patient has experienced: history of falling in past year    Number of falls: 1  Injured during fall?: Yes    Feels unsteady when standing or walking?: Yes    Worried about falling?: No      Home Safety:  Patient has trouble with stairs inside or outside of their home  Patient has working smoke alarms and has working carbon monoxide detector  Home safety hazards include: none  Nutrition:   Current diet is Regular  Medications:   Patient is not currently taking any over-the-counter supplements  Patient is able to manage medications  Activities of Daily Living (ADLs)/Instrumental Activities of Daily Living (IADLs):   Walk and transfer into and out of bed and chair?: Yes  Dress and groom yourself?: Yes    Bathe or shower yourself?: Yes    Feed yourself?  Yes  Do your laundry/housekeeping?: Yes  Manage your money, pay your bills and track your expenses?: Yes  Make your own meals?: Yes    Do your own shopping?: Yes    PREVENTIVE SCREENINGS      Cardiovascular Screening:    General: Screening Not Indicated, History Lipid Disorder and Risks and Benefits Discussed      Diabetes Screening:     General: Risks and Benefits Discussed      Colorectal Cancer Screening:     General: Risks and Benefits Discussed      Prostate Cancer Screening:    General: Risks and Benefits Discussed      Osteoporosis Screening:    General: Risks and Benefits Discussed      Abdominal Aortic Aneurysm (AAA) Screening:        General: Risks and Benefits Discussed      Lung Cancer Screening:     General: Screening Not Indicated and Risks and Benefits Discussed      Hepatitis C Screening:    General: Risks and Benefits Discussed      Radha Potter,

## 2022-01-18 ENCOUNTER — OFFICE VISIT (OUTPATIENT)
Dept: URGENT CARE | Age: 67
End: 2022-01-18
Payer: MEDICARE

## 2022-01-18 VITALS
TEMPERATURE: 97.5 F | HEART RATE: 58 BPM | RESPIRATION RATE: 18 BRPM | WEIGHT: 196 LBS | DIASTOLIC BLOOD PRESSURE: 74 MMHG | SYSTOLIC BLOOD PRESSURE: 119 MMHG | OXYGEN SATURATION: 97 % | HEIGHT: 68 IN | BODY MASS INDEX: 29.7 KG/M2

## 2022-01-18 DIAGNOSIS — M70.22 OLECRANON BURSITIS OF LEFT ELBOW: Primary | ICD-10-CM

## 2022-01-18 PROCEDURE — G0463 HOSPITAL OUTPT CLINIC VISIT: HCPCS | Performed by: PHYSICIAN ASSISTANT

## 2022-01-18 PROCEDURE — 99213 OFFICE O/P EST LOW 20 MIN: CPT | Performed by: PHYSICIAN ASSISTANT

## 2022-01-18 RX ORDER — SACUBITRIL AND VALSARTAN 24; 26 MG/1; MG/1
TABLET, FILM COATED ORAL
COMMUNITY
Start: 2021-11-09

## 2022-01-18 RX ORDER — SULFAMETHOXAZOLE AND TRIMETHOPRIM 800; 160 MG/1; MG/1
1 TABLET ORAL EVERY 12 HOURS SCHEDULED
Qty: 20 TABLET | Refills: 0 | Status: SHIPPED | OUTPATIENT
Start: 2022-01-18 | End: 2022-01-28

## 2022-01-18 NOTE — PROGRESS NOTES
[facility]        NAME: Anabelle Santos is a 73 y o  male  : 1955    MRN: [id number]  DATE: 2022  TIME: 9:25 AM    Assessment and Plan   Olecranon bursitis of left elbow [M70 22]  1  Olecranon bursitis of left elbow  sulfamethoxazole-trimethoprim (BACTRIM DS) 800-160 mg per tablet    Ambulatory Referral to Orthopedic Surgery     Orthopedics referral     Patient Instructions       Follow up with Orthopedics at earliest availability  Take medications as prescribed  Proceed to ER if symptoms worsen  Chief Complaint     Chief Complaint   Patient presents with    Elbow Pain     pt reports swelling of left elbow that started Saturday,  No injury reported         History of Present Illness       Patient is a 73year old male presenting to [facility] with left elbow pain and swelling  Patient reports swelling began about 4 days ago  Patient denies any known injury  Patient reports systemic ill symptoms prior to onset  Pt denies fever at this time  Elbow Pain  This is a new problem  The current episode started in the past 7 days  The problem occurs constantly  The problem has been gradually worsening  Associated symptoms include joint swelling (Left elbow swelling)  Pertinent negatives include no abdominal pain, arthralgias, chest pain, chills, coughing, fever, rash, sore throat or vomiting  Review of Systems   Review of Systems   Constitutional: Negative for chills and fever  HENT: Negative for ear pain and sore throat  Eyes: Negative for pain and visual disturbance  Respiratory: Negative for cough and shortness of breath  Cardiovascular: Negative for chest pain and palpitations  Gastrointestinal: Negative for abdominal pain and vomiting  Genitourinary: Negative for dysuria and hematuria  Musculoskeletal: Positive for joint swelling (Left elbow swelling)  Negative for arthralgias and back pain  Skin: Negative for color change and rash     Neurological: Negative for seizures and syncope  All other systems reviewed and are negative          Current Medications       Current Outpatient Medications:     Cholecalciferol (VITAMIN D) 2000 units CAPS, Take by mouth, Disp: , Rfl:     cyanocobalamin (VITAMIN B-12) 100 mcg tablet, Take by mouth, Disp: , Rfl:     furosemide (LASIX) 20 mg tablet, Take 20 mg by mouth daily, Disp: , Rfl: 1    metoprolol succinate (TOPROL-XL) 25 mg 24 hr tablet, Take 12 5 mg by mouth 2 (two) times a day, Disp: , Rfl: 3    Omega-3 Fatty Acids (FISH OIL) 645 MG CAPS, Take by mouth, Disp: , Rfl:     rivaroxaban (XARELTO) 20 mg tablet, Take by mouth, Disp: , Rfl:     sotalol (BETAPACE) 120 mg tablet, Take 120 mg by mouth every 12 (twelve) hours, Disp: , Rfl: 2    spironolactone (ALDACTONE) 25 mg tablet, Take 25 mg by mouth daily, Disp: , Rfl: 1    Entresto 24-26 MG TABS, , Disp: , Rfl:     finasteride (PROSCAR) 5 mg tablet, Take 1 tablet (5 mg total) by mouth daily (Patient not taking: Reported on 3/11/2020), Disp: 90 tablet, Rfl: 0    lisinopril (ZESTRIL) 2 5 mg tablet, 2 5 mg 2 (two) times a day (Patient not taking: Reported on 1/18/2022 ), Disp: , Rfl: 3    meclizine (ANTIVERT) 25 mg tablet, Take 1 tablet by mouth (Patient not taking: Reported on 1/18/2022 ), Disp: , Rfl:     pantoprazole (PROTONIX) 40 mg tablet, Take 1 tablet by mouth 2 (two) times a day (Patient not taking: Reported on 1/18/2022 ), Disp: , Rfl:     sulfamethoxazole-trimethoprim (BACTRIM DS) 800-160 mg per tablet, Take 1 tablet by mouth every 12 (twelve) hours for 10 days, Disp: 20 tablet, Rfl: 0    Current Allergies     Allergies as of 01/18/2022 - Reviewed 01/18/2022   Allergen Reaction Noted    Statins Myalgia 12/13/2012            The following portions of the patient's history were reviewed and updated as appropriate: allergies, current medications, past family history, past medical history, past social history, past surgical history and problem list      Past Medical History:   Diagnosis Date    At[facility] fibrillation [facility] [address] )     BPH with obstruction/lower urinary tract symptoms     [facility]     Last assessed 10/10/2016     Hypertension     Other retention of urine     Paroxysmal ventricular tachycardia (HCC)     Last assessed 8/23/2016        Past Surgical History:   Procedure Laterality Date    APPENDECTOMY      CARDIAC DEFIBRILLATOR PLACEMENT      Cardio-Dedfib pulse gen venous insertion of electrode for ventricular pacing    HERNIA REPAIR      TONSILLECTOMY         Family History   Problem Relation Age of Onset    Prostate cancer Father     No Known Problems Mother     Heart disease Family          Medications have been verified  Objective   /74   Pulse 58   Temp 97 5 °F (36 4 °C)   Resp 18   Ht 5' 8" (1 727 m)   Wt 88 9 kg (196 lb)   SpO2 97%   BMI 29 80 kg/m²   No LMP for male patient  Physical Exam     Physical Exam  Constitutional:       Appearance: Normal appearance  He is normal weight  HENT:      Head: Normocephalic and atraumatic  Nose: Nose normal       Mouth/Throat:      Mouth: Mucous membranes are moist    Eyes:      Extraocular Movements: Extraocular movements intact  Conjunctiva/sclera: Conjunctivae normal       Pupils: Pupils are equal, round, and reactive to light  Cardiovascular:      Rate and Rhythm: Normal rate  Pulmonary:      Effort: Pulmonary effort is normal    Musculoskeletal:         General: Normal range of motion  Arms:       Cervical back: Normal range of motion and neck supple  Skin:     General: Skin is warm and dry  Neurological:      General: No focal deficit present  Mental Status: He is alert and oriented to person, place, and time     Psychiatric:         Mood and Affect: Mood normal          Behavior: Behavior normal

## 2022-01-18 NOTE — PATIENT INSTRUCTIONS
Elbow Bursitis   WHAT YOU NEED TO KNOW:   Elbow bursitis is inflammation of the bursa in your elbow  The bursa is a fluid-filled sac that acts as a cushion between a bone and a tendon  A tendon is a cord of strong tissue that connects muscles to bones  The bursa is located right under the point of your elbow  DISCHARGE INSTRUCTIONS:   Call your doctor if:   · Your pain and swelling increase  · Your symptoms do not improve after 10 days of treatment  · You have a fever  · You have questions or concerns about your condition or care  Medicines: You may need any of the following:  · NSAIDs , such as ibuprofen, help decrease swelling, pain, and fever  NSAIDs can cause stomach bleeding or kidney problems in certain people  If you take blood thinner medicine, always ask your healthcare provider if NSAIDs are safe for you  Always read the medicine label and follow directions  · Aspirin  helps relieve pain and swelling  Take aspirin exactly as directed by your healthcare provider  · Antibiotics  help fight an infection caused by bacteria  · Steroids  help decrease pain and swelling  Steroids may be given for a short time to relieve acute pain  · Take your medicine as directed  Contact your healthcare provider if you think your medicine is not helping or if you have side effects  Tell him of her if you are allergic to any medicine  Keep a list of the medicines, vitamins, and herbs you take  Include the amounts, and when and why you take them  Bring the list or the pill bottles to follow-up visits  Carry your medicine list with you in case of an emergency  Manage your symptoms:   · Rest your elbow as much as possible to decrease pain and swelling  Slowly start to do more each day  Return to your daily activities as directed  · Apply ice to help decrease swelling and pain  Use an ice pack, or put crushed ice in a plastic bag  Cover the bag with a towel before you place it on your elbow  Apply ice for 15 to 20 minutes, 3 to 4 times each day, as directed  · Use compression to help decrease swelling  Healthcare providers may wrap your arm with tape or an elastic bandage  Loosen the elastic bandage if you start to lose feeling in your fingers  · Elevate (raise) your elbow above the level of your heart as often as you can  This will help decrease swelling and pain  Prop your elbow on pillows or blankets to keep it elevated comfortably  · Go to physical therapy, if directed  A physical therapist teaches you exercises to help improve movement and strength, and to decrease pain  Prevent another elbow injury:   · Avoid injury and pressure to your elbows  Wear elbow pads or protectors when you play sports  Do not lean on your elbows or clench your fists  Do not tightly  small items, such as tools or pens  · Stretch, warm up, and cool down  Always stretch and do warmup and cool-down exercises before and after you exercise  This will help loosen your muscles and decrease stress on your elbow  Rest between workouts  Follow up with your doctor as directed:  Write down your questions so you remember to ask them during your visits  © Copyright [employer] 2021 Information is for End User's use only and may not be sold, redistributed or otherwise used for commercial purposes  All illustrations and images included in CareNotes® are the copyrighted property of [employer]  or Myra Lau  The above information is an  only  It is not intended as medical advice for individual conditions or treatments  Talk to your doctor, nurse or pharmacist before following any medical regimen to see if it is safe and effective for you

## 2022-01-24 ENCOUNTER — APPOINTMENT (OUTPATIENT)
Dept: LAB | Facility: HOSPITAL | Age: 67
End: 2022-01-24
Payer: MEDICARE

## 2022-01-24 VITALS
HEIGHT: 68 IN | DIASTOLIC BLOOD PRESSURE: 72 MMHG | BODY MASS INDEX: 29.7 KG/M2 | WEIGHT: 196 LBS | SYSTOLIC BLOOD PRESSURE: 120 MMHG

## 2022-01-24 DIAGNOSIS — M70.22 OLECRANON BURSITIS OF LEFT ELBOW: Primary | ICD-10-CM

## 2022-01-24 DIAGNOSIS — M70.22 OLECRANON BURSITIS OF LEFT ELBOW: ICD-10-CM

## 2022-01-24 PROCEDURE — 87070 CULTURE OTHR SPECIMN AEROBIC: CPT

## 2022-01-24 PROCEDURE — 87205 SMEAR GRAM STAIN: CPT

## 2022-01-24 PROCEDURE — 99203 OFFICE O/P NEW LOW 30 MIN: CPT | Performed by: PHYSICIAN ASSISTANT

## 2022-01-24 PROCEDURE — 20605 DRAIN/INJ JOINT/BURSA W/O US: CPT | Performed by: PHYSICIAN ASSISTANT

## 2022-01-24 NOTE — PROGRESS NOTES
Patient Name:  Ubaldo Reich  MRN:  [id number]    Assessment & Plan     Left elbow olecranon bursitis  1  Left elbow olecranon bursa aspirated in the office today yielding 4 mL of bloody fluid  2  Although the olecranon bursa does not appear to be infected at this time I will send the fluid from the aspiration for Gram stain and culture  3  Advised patient complete the oral antibiotic which was prescribed for him at urgent care  4  Advised against direct pressure on the left elbow  5  I will contact the patient with culture results and follow-up will be determined by these results  Chief Complaint     Left Elbow Swelling    History of the Present Illness     Ubaldo Reich is a 73 y o  male reports to the office today for evaluation of his left elbow  He notes an onset of swelling approximately two weeks ago  He denies any injury or trauma  Initially significant swelling with evident over the olecranon elbow with associated warmth  He denies any erythema or drainage  He noted mild discomfort due to the swelling itself  He reported to urgent care on 1/18/22  At that time he was prescribed an oral antibiotic  Since then he notes improvement with regards to the swelling  He denies any pain at this time  He denies any erythema or drainage as well  No numbness or tingling  No fevers or chills  Physical Exam     /72   Ht 5' 8" (1 727 m)   Wt 88 9 kg (196 lb)   BMI 29 80 kg/m²     Left elbow:  Skin intact  No gross deformity  No erythema or ecchymosis  There is soft tissue swelling and a collection appreciated over the olecranon bursa  There is no tenderness to palpation about the olecranon bursa  Full elbow range of motion with minimal discomfort at terminal flexion  Elbow stable to varus and valgus stress  Neurovascularly intact distally  Eyes: Anicteric sclerae  ENT: Trachea midline  Lungs: Normal respiratory effort  CV: Capillary refill is less than 2 seconds    Skin: Intact without erythema  Lymph: No palpable lymphadenopathy  Neuro: Sensation is grossly intact to light touch  Psych: Mood and affect are appropriate        Past Medical History:   Diagnosis Date    [facility] [address] [facility]     BPH with obstruction/lower urinary tract symptoms     [facility]     Last assessed 10/10/2016     Hypertension     Other retention of urine     Paroxysmal ventricular tachycardia (HCC)     Last assessed 8/23/2016        Past Surgical History:   Procedure Laterality Date    APPENDECTOMY      CARDIAC DEFIBRILLATOR PLACEMENT      Cardio-Dedfib pulse gen venous insertion of electrode for ventricular pacing    HERNIA REPAIR      TONSILLECTOMY         Allergies   Allergen Reactions    Statins Myalgia       Current Outpatient Medications on File Prior to Visit   Medication Sig Dispense Refill    Cholecalciferol (VITAMIN D) 2000 units CAPS Take by mouth      cyanocobalamin (VITAMIN B-12) 100 mcg tablet Take by mouth      Entresto 24-26 MG TABS       finasteride (PROSCAR) 5 mg tablet Take 1 tablet (5 mg total) by mouth daily (Patient not taking: Reported on 3/11/2020) 90 tablet 0    furosemide (LASIX) 20 mg tablet Take 20 mg by mouth daily  1    lisinopril (ZESTRIL) 2 5 mg tablet 2 5 mg 2 (two) times a day (Patient not taking: Reported on 1/18/2022 )  3    meclizine (ANTIVERT) 25 mg tablet Take 1 tablet by mouth (Patient not taking: Reported on 1/18/2022 )      metoprolol succinate (TOPROL-XL) 25 mg 24 hr tablet Take 12 5 mg by mouth 2 (two) times a day  3    Omega-3 Fatty Acids (FISH OIL) 645 MG CAPS Take by mouth      pantoprazole (PROTONIX) 40 mg tablet Take 1 tablet by mouth 2 (two) times a day (Patient not taking: Reported on 1/18/2022 )      rivaroxaban (XARELTO) 20 mg tablet Take by mouth      sotalol (BETAPACE) 120 mg tablet Take 120 mg by mouth every 12 (twelve) hours  2    spironolactone (ALDACTONE) 25 mg tablet Take 25 mg by mouth daily  1    sulfamethoxazole-trimethoprim (BACTRIM DS) 800-160 mg per tablet Take 1 tablet by mouth every 12 (twelve) hours for 10 days 20 tablet 0     No current facility-administered medications on file prior to visit  Social History     Tobacco Use    Smoking status: Never Smoker    Smokeless tobacco: Never Used   Vaping Use    Vaping Use: Never used   Substance Use Topics    Alcohol use: Never     Comment: Social     Drug use: Never       Family History   Problem Relation Age of Onset    Prostate cancer Father     No Known Problems Mother     Heart disease Family        Review of Systems     As stated in the HPI  All other systems reviewed and are negative        Medium joint arthrocentesis: L olecranon bursa  Procedure Details  Location: elbow - L olecranon bursa  Needle size: 18 G  Ultrasound guidance: no  Approach: lateral     Aspirate amount: 4 mL  Aspirate: bloody  Analysis: fluid sample sent for laboratory analysis    Patient tolerance: patient tolerated the procedure well with no immediate complications  Dressing:  Sterile dressing applied (and compressive ace wrap applied)

## 2022-01-27 LAB
BACTERIA SPEC BFLD CULT: NO GROWTH
GRAM STN SPEC: NORMAL

## 2023-04-24 ENCOUNTER — TELEPHONE (OUTPATIENT)
Dept: FAMILY MEDICINE CLINIC | Facility: CLINIC | Age: 68
End: 2023-04-24

## 2023-04-24 NOTE — TELEPHONE ENCOUNTER
Rachael Salmon called from [facility] home care stating the patient called to cancel  appointment for today  She states this is considered a missed physical therapy visit